# Patient Record
Sex: MALE | Race: WHITE | NOT HISPANIC OR LATINO | Employment: OTHER | URBAN - METROPOLITAN AREA
[De-identification: names, ages, dates, MRNs, and addresses within clinical notes are randomized per-mention and may not be internally consistent; named-entity substitution may affect disease eponyms.]

---

## 2017-12-20 ENCOUNTER — OFFICE VISIT (OUTPATIENT)
Dept: URGENT CARE | Facility: PHYSICIAN GROUP | Age: 67
End: 2017-12-20
Payer: MEDICARE

## 2017-12-20 VITALS
BODY MASS INDEX: 28.72 KG/M2 | OXYGEN SATURATION: 94 % | RESPIRATION RATE: 18 BRPM | SYSTOLIC BLOOD PRESSURE: 118 MMHG | HEIGHT: 67 IN | TEMPERATURE: 98.7 F | HEART RATE: 81 BPM | DIASTOLIC BLOOD PRESSURE: 78 MMHG | WEIGHT: 183 LBS

## 2017-12-20 DIAGNOSIS — Z72.0 TOBACCO ABUSE: ICD-10-CM

## 2017-12-20 DIAGNOSIS — J40 BRONCHITIS: ICD-10-CM

## 2017-12-20 PROCEDURE — 99203 OFFICE O/P NEW LOW 30 MIN: CPT | Performed by: EMERGENCY MEDICINE

## 2017-12-20 RX ORDER — BENZONATATE 100 MG/1
100 CAPSULE ORAL 3 TIMES DAILY PRN
Qty: 30 CAP | Refills: 0 | Status: SHIPPED | OUTPATIENT
Start: 2017-12-20

## 2017-12-20 RX ORDER — RANOLAZINE 500 MG/1
500 TABLET, EXTENDED RELEASE ORAL 2 TIMES DAILY
COMMUNITY

## 2017-12-20 RX ORDER — ASPIRIN 81 MG/1
81 TABLET, CHEWABLE ORAL DAILY
COMMUNITY

## 2017-12-20 RX ORDER — ATENOLOL 50 MG/1
50 TABLET ORAL DAILY
COMMUNITY

## 2017-12-20 RX ORDER — SIMVASTATIN 40 MG
40 TABLET ORAL NIGHTLY
COMMUNITY
End: 2018-05-16

## 2017-12-20 RX ORDER — PANTOPRAZOLE SODIUM 40 MG/1
40 TABLET, DELAYED RELEASE ORAL DAILY
COMMUNITY

## 2017-12-20 RX ORDER — CLOPIDOGREL BISULFATE 75 MG/1
75 TABLET ORAL DAILY
COMMUNITY

## 2017-12-20 ASSESSMENT — ENCOUNTER SYMPTOMS
SPUTUM PRODUCTION: 0
BACK PAIN: 0
SENSORY CHANGE: 0
DIARRHEA: 0
ABDOMINAL PAIN: 0
NERVOUS/ANXIOUS: 0
CHILLS: 0
FEVER: 0
PALPITATIONS: 0
SPEECH CHANGE: 0
VOMITING: 0
COUGH: 1
NECK PAIN: 0
EYE PAIN: 0
EYE DISCHARGE: 0
HEMOPTYSIS: 0

## 2017-12-20 NOTE — LETTER
December 20, 2017        Terell Kunz  [address] [facility] [address]        Dear Terell:    Please ask to be excused from work from last Thursday thru tomorrow for medical reasons.    If you have any questions or concerns, please don't hesitate to call.        Sincerely,        Gera Kuhn M.D.    Electronically Signed

## 2017-12-21 NOTE — PROGRESS NOTES
"Subjective:      Terell Kunz is a 67 y.o. male who presents with Flu Like Symptoms (cough, chills, fever, sore throat x 1 week)            HPI  Pt with URI symptoms, does not get a flu shot, with had flu A last week. Pt with cough. Pt 50 pack yr hx of cigarette, s/p bypass surgery. 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 He states that his wife who was seen in the urgent care a week ago and is subsequently in the emergency department as lingering issues I recommended that he bring her to the urgent care or more appropriately to the emergency department.    Allergies   Allergen Reactions   • Codeine    • Pcn [Penicillins]      Social History     Social History   • Marital status:      Spouse name: N/A   • Number of children: N/A   • Years of education: N/A     Occupational History   • Not on file.     Social History Main Topics   • Smoking status: Heavy Tobacco Smoker     Packs/day: 1.00     Years: 50.00   • Smokeless tobacco: Never Used   • Alcohol use Not on file   • Drug use: Unknown   • Sexual activity: Not on file     Other Topics Concern   • Not on file     Social History Narrative   • No narrative on file     No family history on file. Wife with flu A.  Review of Systems   Constitutional: Negative for chills and fever.   Eyes: Negative for pain and discharge.   Respiratory: Positive for cough. Negative for hemoptysis and sputum production.    Cardiovascular: Negative for chest pain and palpitations.   Gastrointestinal: Negative for abdominal pain, diarrhea and vomiting.   Musculoskeletal: Negative for back pain and neck pain.   Skin: Negative for rash.   Neurological: Negative for sensory change and speech change.   Psychiatric/Behavioral: The patient is not nervous/anxious.           Objective:     /78   Pulse 81   Temp 37.1 °C (98.7 °F)   Resp 18   Ht 1.689 m (5' 6.5\")   Wt 83 kg (183 lb)   SpO2 94%   BMI " 29.09 kg/m²      Physical Exam   Constitutional: He appears well-developed and well-nourished. No distress.   HENT:   Head: Normocephalic and atraumatic.   Right Ear: External ear normal.   Left Ear: External ear normal.   Eyes: Conjunctivae are normal. Right eye exhibits no discharge. Left eye exhibits no discharge.   Neck: Normal range of motion.   Cardiovascular: Normal rate.    Pulmonary/Chest: Effort normal and breath sounds normal.   Musculoskeletal: Normal range of motion. He exhibits no edema, tenderness or deformity.   Neurological: He is alert.   Skin: Skin is dry. He is not diaphoretic. No erythema.   Psychiatric: He has a normal mood and affect. His behavior is normal.   Vitals reviewed.              Assessment/Plan:     DX: URI           Tobacco abuse.      I am recommending the patient initiate/ continue hydration efforts including the use of a vaporizer/humidifier/ netti pot. I also recommend the pt, initiate Mucinex  Sudafed or Dayquil if not hypertensive. In addition the patient will initiate the prescribed prescription medication/s:Tessalon. If the patient's condition exacerbates with worsening dysphagia, shortness of breath, uncontrolled fever, headache or chest pressure he/she will return immediately to the urgent care or go to  the emergency department for further evaluation. PT WILL TRY TO STOP SMOKING. Patient was given a work note to return to work in 2 days after being off for one week.    Gera Kuhn

## 2018-01-19 ENCOUNTER — TELEPHONE (OUTPATIENT)
Dept: CARDIOLOGY | Facility: MEDICAL CENTER | Age: 68
End: 2018-01-19

## 2018-01-19 NOTE — TELEPHONE ENCOUNTER
Called patient to see if he had any other testing or Cardiologist. Patient seen in [place] and Papa from our office who scheduled the appointment spoke with patients daughter and  sent a medical records request on 1/18/18. Patient said he signed a release there before he moved. Cs

## 2018-05-16 ENCOUNTER — OFFICE VISIT (OUTPATIENT)
Dept: CARDIOLOGY | Facility: MEDICAL CENTER | Age: 68
End: 2018-05-16
Payer: MEDICARE

## 2018-05-16 VITALS
BODY MASS INDEX: 28.97 KG/M2 | WEIGHT: 184.6 LBS | SYSTOLIC BLOOD PRESSURE: 138 MMHG | HEIGHT: 67 IN | HEART RATE: 52 BPM | DIASTOLIC BLOOD PRESSURE: 70 MMHG | OXYGEN SATURATION: 96 %

## 2018-05-16 DIAGNOSIS — I25.10 CORONARY ARTERY DISEASE DUE TO CALCIFIED CORONARY LESION: ICD-10-CM

## 2018-05-16 DIAGNOSIS — Z86.69 HISTORY OF OBSTRUCTIVE SLEEP APNEA: ICD-10-CM

## 2018-05-16 DIAGNOSIS — I25.84 CORONARY ARTERY DISEASE DUE TO CALCIFIED CORONARY LESION: ICD-10-CM

## 2018-05-16 DIAGNOSIS — I73.9 PERIPHERAL ARTERIAL DISEASE (HCC): ICD-10-CM

## 2018-05-16 DIAGNOSIS — E78.5 DYSLIPIDEMIA: ICD-10-CM

## 2018-05-16 PROCEDURE — 99204 OFFICE O/P NEW MOD 45 MIN: CPT | Performed by: INTERNAL MEDICINE

## 2018-05-16 RX ORDER — AZITHROMYCIN 250 MG/1
TABLET, FILM COATED ORAL
COMMUNITY

## 2018-05-16 RX ORDER — LEVOFLOXACIN 500 MG/1
TABLET, FILM COATED ORAL
COMMUNITY

## 2018-05-16 RX ORDER — HYDROMORPHONE HYDROCHLORIDE 2 MG/1
TABLET ORAL
COMMUNITY

## 2018-05-16 RX ORDER — LORAZEPAM 1 MG/1
TABLET ORAL
COMMUNITY

## 2018-05-16 RX ORDER — CLOTRIMAZOLE AND BETAMETHASONE DIPROPIONATE 10; .64 MG/G; MG/G
CREAM TOPICAL
COMMUNITY

## 2018-05-16 RX ORDER — ATORVASTATIN CALCIUM 80 MG/1
80 TABLET, FILM COATED ORAL DAILY
Qty: 30 TAB | Refills: 11 | Status: SHIPPED | OUTPATIENT
Start: 2018-05-16

## 2018-05-16 RX ORDER — DOXYCYCLINE HYCLATE 100 MG/1
CAPSULE ORAL
COMMUNITY

## 2018-05-16 RX ORDER — HYDROMORPHONE HYDROCHLORIDE 4 MG/1
TABLET ORAL
COMMUNITY

## 2018-05-16 RX ORDER — CEPHALEXIN 250 MG/1
CAPSULE ORAL
COMMUNITY

## 2018-05-16 RX ORDER — CEPHALEXIN 500 MG/1
CAPSULE ORAL
COMMUNITY

## 2018-05-16 ASSESSMENT — ENCOUNTER SYMPTOMS
COUGH: 1
HEMOPTYSIS: 0
PALPITATIONS: 0
DEPRESSION: 1
WHEEZING: 0
LOSS OF CONSCIOUSNESS: 0
CHILLS: 0
SPEECH CHANGE: 0
BLURRED VISION: 0
MEMORY LOSS: 1
EYE DISCHARGE: 0
NAUSEA: 0
ABDOMINAL PAIN: 0
MYALGIAS: 0
EYE PAIN: 0
BRUISES/BLEEDS EASILY: 1
NERVOUS/ANXIOUS: 1
VOMITING: 0
WEIGHT LOSS: 1
FEVER: 0

## 2018-05-16 NOTE — PROGRESS NOTES
Chief complaint: Coronary artery disease    Subjective:   Terell Kunz is a 67 y.o. male who presents today for a new patient evaluation because of a history of coronary artery disease and peripheral vascular disease.  He has a history of aortofemoral bypass graft surgery in about 1999.  He has had subsequent stents.  He had stents to the circumflex and marginal branch of the circumflex in 2015.  He is also had right carotid endarterectomy and a history of bilateral iliac stents and a renal artery stent.    He denies any chest discomfort, dyspnea on exertion, PND, orthopnea or edema.  He has had no palpitations or lightheadedness.  He does not get much in the way of regular exercise.    Past Medical History:   Diagnosis Date   • CAD (coronary artery disease)    • Hyperlipidemia    • PVD (peripheral vascular disease) ([facility]ly [facility])      Past Surgical History:   Procedure Laterality Date   • AORTOFEMORAL BYPASS     • APPENDECTOMY     • CAROTID ENDARTERECTOMY Right    • CORONARY ARTERY BYPASS, 3     • MULTIPLE CORONARY ARTERY BYPASS       Family History   Problem Relation Age of Onset   • Heart Disease Neg Hx      Social History     Social History   • Marital status:      Spouse name: N/A   • Number of children: N/A   • Years of education: N/A     Occupational History   • Not on file.     Social History Main Topics   • Smoking status: Heavy Tobacco Smoker     Packs/day: 1.00     Years: 50.00   • Smokeless tobacco: Never Used   • Alcohol use Yes      Comment: Approximately 1 drink monthly   • Drug use: Unknown   • Sexual activity: Not on file     Other Topics Concern   • Not on file     Social History Narrative   • No narrative on file     Allergies   Allergen Reactions   • Codeine Rash   • Pcn [Penicillins]    • Penicillin G Rash     Outpatient Encounter Prescriptions as of 5/16/2018   Medication Sig Dispense Refill   • aspirin 81 MG tablet aspirin 81 mg tablet,delayed release   Take 1 tablet every day by oral route.     •  HYDROmorphone (DILAUDID) 2 MG Tab hydromorphone 2 mg tablet     • HYDROmorphone (DILAUDID) 4 MG Tab hydromorphone 4 mg tablet     • LORazepam (ATIVAN) 1 MG Tab lorazepam 1 mg tablet     • simvastatin (ZOCOR) 40 MG Tab Take 40 mg by mouth every evening.     • atenolol (TENORMIN) 50 MG Tab Take 50 mg by mouth every day.     • pantoprazole (PROTONIX) 40 MG Tablet Delayed Response Take 40 mg by mouth every day.     • clopidogrel (PLAVIX) 75 MG Tab Take 75 mg by mouth every day.     • ranolazine (RANEXA) 500 MG TABLET SR 12 HR Take 500 mg by mouth 2 times a day.     • azithromycin (ZITHROMAX) 250 MG Tab azithromycin 250 mg tablet     • cephALEXin (KEFLEX) 250 MG Cap cephalexin 250 mg capsule     • cephALEXin (KEFLEX) 500 MG Cap cephalexin 500 mg capsule     • doxycycline (VIBRAMYCIN) 100 MG Cap doxycycline hyclate 100 mg capsule     • hydrocodone-acetaminophen (VICODIN) 5-500 MG Tab hydrocodone 5 mg-acetaminophen 500 mg tablet     • clotrimazole-betamethasone (LOTRISONE) 1-0.05 % Cream hydrocortisone 1 % topical cream     • levoFLOXacin (LEVAQUIN) 500 MG tablet levofloxacin 500 mg tablet     • aspirin (ASA) 81 MG Chew Tab chewable tablet Take 81 mg by mouth every day.     • benzonatate (TESSALON) 100 MG Cap Take 1 Cap by mouth 3 times a day as needed for Cough. 30 Cap 0     No facility-administered encounter medications on file as of 5/16/2018.      Review of Systems   Constitutional: Positive for malaise/fatigue (He has been having difficulty with fatigue and daytime somnolence.  He stopped using CPAP therapy about 7 months or so ago.) and weight loss (He has lost about 15 pounds in the last 4-5 months.  He feels this is secondary to his daughter placing him on a vegetarian diet.). Negative for chills and fever.   HENT: Negative for congestion.    Eyes: Negative for blurred vision, pain and discharge.   Respiratory: Positive for cough. Negative for hemoptysis and wheezing.    Cardiovascular: Negative for chest pain and  "palpitations.   Gastrointestinal: Negative for abdominal pain, nausea and vomiting.   Musculoskeletal: Negative for joint pain and myalgias.   Skin: Negative for itching and rash.   Neurological: Negative for speech change and loss of consciousness.   Endo/Heme/Allergies: Bruises/bleeds easily.   Psychiatric/Behavioral: Positive for depression and memory loss. The patient is nervous/anxious.    All other systems reviewed and are negative.       Objective:   /70   Pulse (!) 52   Ht 1.689 m (5' 6.5\")   Wt 83.7 kg (184 lb 9.6 oz)   SpO2 96%   BMI 29.35 kg/m²     Physical Exam   Constitutional: He is oriented to person, place, and time. He appears well-developed and well-nourished. No distress.   HENT:   Head: Normocephalic and atraumatic.   Mouth/Throat: Mucous membranes are normal.   Neck: No JVD present. No thyromegaly present.   Cardiovascular: Normal rate, regular rhythm and intact distal pulses.  Exam reveals no gallop.    Murmur (2/6 systolic the base versus a transmitted bruit from the right subclavian area) heard.  Pulses:       Carotid pulses are 2+ on the right side with bruit, and 2+ on the left side.       Radial pulses are 2+ on the right side, and 2+ on the left side.        Femoral pulses are 2+ on the right side, and 2+ on the left side.       Posterior tibial pulses are 1+ on the right side, and 1+ on the left side.   Pulmonary/Chest: Effort normal and breath sounds normal. He has no rales.   Abdominal: Soft. There is no splenomegaly or hepatomegaly.   Musculoskeletal: Normal range of motion. He exhibits no edema.   Lymphadenopathy:     He has no cervical adenopathy.   Neurological: He is alert and oriented to person, place, and time. He has normal strength. He exhibits normal muscle tone.   Skin: Skin is warm and dry. No rash noted.   Psychiatric: He has a normal mood and affect. His behavior is normal.     Outside record review:    Laboratory from August 31, 2016: Total cholesterol 151, " triglycerides 153, HDL 42 and LDL 78.    Myocardial perfusion scan from April 2016 that showed a fixed inferior defect with a normal ejection fraction at 72%.  No segmental wall motion ambulates are noted.  There is no evidence of ischemia.  Carotid ultrasound from April 2016 patient was status post right CEA.  There is tortuous internal carotid arteries bilaterally with turbulent flow.  There is no mention of significant obstruction.  Echocardiogram from June 2017: This was described as a suboptimal study and there is no mention of LV wall thickness her LV function.  Mild mitral insufficiency and pulmonic insufficiency were noted.    Assessment:     1. Coronary artery disease due to calcified coronary lesion: History of CABG ×3 in 1999 and subsequent stents.     2. Peripheral arterial disease (HCC); bifemoral bypass in 1988, right CEA in approximately 2013 and left renal artery stent in 2002     3. Dyslipidemia         Medical Decision Making:  Today's Assessment / Status / Plan:   Mr. Kunz is clinically stable.  He does have severe vascular disease.  His echocardiogram in 2017 was suboptimal.  We will see him back in follow-up we might discuss a repeat echocardiogram with contrast.  Ultimately, we will reevaluate his carotids with an ultrasound and may further evaluate his subclavian arteries.  Given the current guidelines, we will switch him from simvastatin to atorvastatin 80 mg daily.  He will have lab work in about 6 weeks and follow-up in a couple of months.

## 2018-08-13 ENCOUNTER — APPOINTMENT (RX ONLY)
Dept: URBAN - METROPOLITAN AREA CLINIC 4 | Facility: CLINIC | Age: 68
Setting detail: DERMATOLOGY
End: 2018-08-13

## 2018-08-13 DIAGNOSIS — L81.4 OTHER MELANIN HYPERPIGMENTATION: ICD-10-CM

## 2018-08-13 DIAGNOSIS — L57.0 ACTINIC KERATOSIS: ICD-10-CM

## 2018-08-13 DIAGNOSIS — Z85.828 PERSONAL HISTORY OF OTHER MALIGNANT NEOPLASM OF SKIN: ICD-10-CM

## 2018-08-13 DIAGNOSIS — L82.1 OTHER SEBORRHEIC KERATOSIS: ICD-10-CM

## 2018-08-13 PROBLEM — D48.5 NEOPLASM OF UNCERTAIN BEHAVIOR OF SKIN: Status: ACTIVE | Noted: 2018-08-13

## 2018-08-13 PROBLEM — I25.10 ATHEROSCLEROTIC HEART DISEASE OF NATIVE CORONARY ARTERY WITHOUT ANGINA PECTORIS: Status: ACTIVE | Noted: 2018-08-13

## 2018-08-13 PROCEDURE — ? COUNSELING

## 2018-08-13 PROCEDURE — 17000 DESTRUCT PREMALG LESION: CPT

## 2018-08-13 PROCEDURE — 17003 DESTRUCT PREMALG LES 2-14: CPT

## 2018-08-13 PROCEDURE — 11100: CPT | Mod: 59

## 2018-08-13 PROCEDURE — ? BIOPSY BY SHAVE METHOD

## 2018-08-13 PROCEDURE — ? LIQUID NITROGEN

## 2018-08-13 PROCEDURE — 69100 BIOPSY OF EXTERNAL EAR: CPT | Mod: 59

## 2018-08-13 PROCEDURE — 99203 OFFICE O/P NEW LOW 30 MIN: CPT | Mod: 25

## 2018-08-13 PROCEDURE — ? DIAGNOSIS COMMENT

## 2018-08-13 ASSESSMENT — LOCATION SIMPLE DESCRIPTION DERM
LOCATION SIMPLE: CHEST
LOCATION SIMPLE: RIGHT UPPER BACK
LOCATION SIMPLE: SCALP
LOCATION SIMPLE: LEFT SCALP

## 2018-08-13 ASSESSMENT — LOCATION DETAILED DESCRIPTION DERM
LOCATION DETAILED: RIGHT MEDIAL INFERIOR CHEST
LOCATION DETAILED: RIGHT SUPERIOR MEDIAL UPPER BACK
LOCATION DETAILED: LEFT CENTRAL FRONTAL SCALP
LOCATION DETAILED: RIGHT SUPERIOR PARIETAL SCALP

## 2018-08-13 ASSESSMENT — LOCATION ZONE DERM
LOCATION ZONE: TRUNK
LOCATION ZONE: SCALP

## 2021-01-15 DIAGNOSIS — Z23 NEED FOR VACCINATION: ICD-10-CM

## 2022-12-17 NOTE — PROCEDURE: BIOPSY BY SHAVE METHOD
Patient to ER from [address] [place] [address] with c/o weakness and nausea X1 week. Patient c/o bilateral leg pain. Patient ambulatory with steady gait.
Lab: [id number]
Type Of Destruction Used: Curettage
Size Of Lesion In Cm: 0
Cryotherapy Text: The wound bed was treated with cryotherapy after the biopsy was performed.
Hemostasis: Drysol
Electrodesiccation And Curettage Text: The wound bed was treated with electrodesiccation and curettage after the biopsy was performed.
Notification Instructions: Patient will be notified of biopsy results. However, patient instructed to call the office if not contacted within 2 weeks.
Silver Nitrate Text: The wound bed was treated with silver nitrate after the biopsy was performed.
Post-Care Instructions: I reviewed with the patient in detail post-care instructions. Patient is to keep the biopsy site dry overnight. Gentle cleansing daily.  Apply petroleum ointment daily until healed. Patient may apply hydrogen peroxide soaks to remove any crusting.
Render Post-Care Instructions In Note?: yes
Curettage Text: The wound bed was treated with curettage after the biopsy was performed.
Detail Level: Detailed
Depth Of Biopsy: dermis
Destruction After The Procedure: No
Electrodesiccation Text: The wound bed was treated with electrodesiccation after the biopsy was performed.
Dressing: bandage
Anesthesia Volume In Cc: 0.5
Billing Type: Third-Party Bill
Consent: Written consent was obtained and risks were reviewed including but not limited to scarring, infection, bleeding, scabbing, incomplete removal, nerve damage and allergy to anesthesia.
Anesthesia Type: 1% lidocaine with 1:100,000 epinephrine and a 1:10 solution of 8.4% sodium bicarbonate
Biopsy Method: Personna blade
Lab Facility: 
Biopsy Type: H and E
Wound Care: Petrolatum